# Patient Record
Sex: FEMALE | Race: WHITE | NOT HISPANIC OR LATINO | Employment: STUDENT | URBAN - METROPOLITAN AREA
[De-identification: names, ages, dates, MRNs, and addresses within clinical notes are randomized per-mention and may not be internally consistent; named-entity substitution may affect disease eponyms.]

---

## 2024-01-02 ENCOUNTER — OFFICE VISIT (OUTPATIENT)
Dept: OBSTETRICS AND GYNECOLOGY | Facility: CLINIC | Age: 24
End: 2024-01-02
Payer: COMMERCIAL

## 2024-01-02 VITALS
WEIGHT: 120 LBS | SYSTOLIC BLOOD PRESSURE: 124 MMHG | HEIGHT: 62 IN | BODY MASS INDEX: 22.08 KG/M2 | DIASTOLIC BLOOD PRESSURE: 84 MMHG

## 2024-01-02 DIAGNOSIS — R10.2 VAGINAL PAIN: Primary | ICD-10-CM

## 2024-01-02 PROCEDURE — 1160F RVW MEDS BY RX/DR IN RCRD: CPT | Mod: S$GLB,,, | Performed by: OBSTETRICS & GYNECOLOGY

## 2024-01-02 PROCEDURE — 1159F MED LIST DOCD IN RCRD: CPT | Mod: S$GLB,,, | Performed by: OBSTETRICS & GYNECOLOGY

## 2024-01-02 PROCEDURE — 3008F BODY MASS INDEX DOCD: CPT | Mod: S$GLB,,, | Performed by: OBSTETRICS & GYNECOLOGY

## 2024-01-02 PROCEDURE — 3074F SYST BP LT 130 MM HG: CPT | Mod: S$GLB,,, | Performed by: OBSTETRICS & GYNECOLOGY

## 2024-01-02 PROCEDURE — 99203 OFFICE O/P NEW LOW 30 MIN: CPT | Mod: S$GLB,,, | Performed by: OBSTETRICS & GYNECOLOGY

## 2024-01-02 PROCEDURE — 3079F DIAST BP 80-89 MM HG: CPT | Mod: S$GLB,,, | Performed by: OBSTETRICS & GYNECOLOGY

## 2024-01-02 NOTE — PROGRESS NOTES
Marimar Coreas  complains of  VAGINAL PAIN WITH ATTEMPTED INSERTION OF TAMPON.  SHE HAS NEVER BEEN SEXUALLY ACTIVE.  She states that in the past she has been able to use a tampon, but more recently she feels like something is in the way.  She is concerned about an imperforate hymen or other anatomical abnormality    History reviewed. No pertinent past medical history.  Past Surgical History:   Procedure Laterality Date    WISDOM TOOTH EXTRACTION       History reviewed. No pertinent family history.  Review of patient's allergies indicates:   Allergen Reactions    Oseltamivir Blisters, Hives and Rash     Social History     Socioeconomic History    Marital status: Single   Tobacco Use    Smoking status: Never    Smokeless tobacco: Never    Tobacco comments:     I have smoked about 2 cigarettes in my life but that's it   Substance and Sexual Activity    Alcohol use: Yes    Drug use: Never    Sexual activity: Never       ROS:   See HPI    Vitals:    24 0930   BP: 124/84     GENERAL: no distress     GENITALIA: normal external genitalia, no erythema, no discharge and hymen normal  URETHRA: normal appearing urethra with no masses, tenderness or lesions  VAGINA: normal vagina   Speculum exam not done, but digital exam reveals a normal cervix and normal-size uterus.  No pelvic masses appreciated.      Marimar was seen today for consult.    Diagnoses and all orders for this visit:    Vaginal pain         No anatomical abnormality noted, hymen appropriate.  Reassured